# Patient Record
Sex: FEMALE | Race: WHITE | NOT HISPANIC OR LATINO | Employment: OTHER | URBAN - METROPOLITAN AREA
[De-identification: names, ages, dates, MRNs, and addresses within clinical notes are randomized per-mention and may not be internally consistent; named-entity substitution may affect disease eponyms.]

---

## 2021-04-12 ENCOUNTER — HOSPITAL ENCOUNTER (OUTPATIENT)
Dept: LAB | Facility: MEDICAL CENTER | Age: 76
End: 2021-04-12
Attending: ORTHOPAEDIC SURGERY
Payer: MEDICARE

## 2021-04-12 LAB
COVID ORDER STATUS COVID19: NORMAL
SARS-COV-2 RNA RESP QL NAA+PROBE: NOTDETECTED
SPECIMEN SOURCE: NORMAL

## 2021-04-12 PROCEDURE — U0003 INFECTIOUS AGENT DETECTION BY NUCLEIC ACID (DNA OR RNA); SEVERE ACUTE RESPIRATORY SYNDROME CORONAVIRUS 2 (SARS-COV-2) (CORONAVIRUS DISEASE [COVID-19]), AMPLIFIED PROBE TECHNIQUE, MAKING USE OF HIGH THROUGHPUT TECHNOLOGIES AS DESCRIBED BY CMS-2020-01-R: HCPCS

## 2021-04-12 PROCEDURE — U0005 INFEC AGEN DETEC AMPLI PROBE: HCPCS

## 2022-06-22 ENCOUNTER — HOSPITAL ENCOUNTER (OUTPATIENT)
Dept: RADIOLOGY | Facility: MEDICAL CENTER | Age: 77
End: 2022-06-22
Attending: PHYSICAL MEDICINE & REHABILITATION
Payer: MEDICARE

## 2022-06-22 DIAGNOSIS — M54.50 ACUTE BILATERAL LOW BACK PAIN, UNSPECIFIED WHETHER SCIATICA PRESENT: ICD-10-CM

## 2022-06-22 DIAGNOSIS — M54.6 PAIN IN THORACIC SPINE: ICD-10-CM

## 2022-06-22 PROCEDURE — 72100 X-RAY EXAM L-S SPINE 2/3 VWS: CPT

## 2022-06-22 PROCEDURE — 72070 X-RAY EXAM THORAC SPINE 2VWS: CPT

## 2022-10-04 ENCOUNTER — HOSPITAL ENCOUNTER (OUTPATIENT)
Facility: MEDICAL CENTER | Age: 77
End: 2022-10-04
Attending: STUDENT IN AN ORGANIZED HEALTH CARE EDUCATION/TRAINING PROGRAM
Payer: MEDICARE

## 2022-10-04 PROCEDURE — 87086 URINE CULTURE/COLONY COUNT: CPT

## 2022-10-07 LAB
BACTERIA UR CULT: NORMAL
SIGNIFICANT IND 70042: NORMAL
SITE SITE: NORMAL
SOURCE SOURCE: NORMAL

## 2022-11-22 ENCOUNTER — PRE-ADMISSION TESTING (OUTPATIENT)
Dept: ADMISSIONS | Facility: MEDICAL CENTER | Age: 77
End: 2022-11-22
Attending: STUDENT IN AN ORGANIZED HEALTH CARE EDUCATION/TRAINING PROGRAM
Payer: MEDICARE

## 2022-11-22 RX ORDER — ACETAMINOPHEN 500 MG
500-1000 TABLET ORAL EVERY 6 HOURS PRN
COMMUNITY

## 2022-11-22 RX ORDER — DIPHENHYDRAMINE HCL 25 MG
25 CAPSULE ORAL EVERY 6 HOURS PRN
COMMUNITY

## 2022-11-22 RX ORDER — DOCUSATE SODIUM 100 MG/1
1 CAPSULE, LIQUID FILLED ORAL 2 TIMES DAILY PRN
COMMUNITY
Start: 2022-11-03

## 2022-11-22 RX ORDER — BENAZEPRIL HYDROCHLORIDE 10 MG/1
10 TABLET ORAL EVERY MORNING
COMMUNITY
Start: 2022-09-14

## 2022-11-22 RX ORDER — OMEPRAZOLE 20 MG/1
20 CAPSULE, DELAYED RELEASE ORAL DAILY
COMMUNITY
Start: 2022-09-14

## 2022-11-22 RX ORDER — OXYCODONE HYDROCHLORIDE 5 MG/1
5 TABLET ORAL EVERY 8 HOURS PRN
Status: ON HOLD | COMMUNITY
Start: 2022-11-03 | End: 2022-12-08

## 2022-11-22 RX ORDER — OXYBUTYNIN CHLORIDE 10 MG/1
10 TABLET, EXTENDED RELEASE ORAL EVERY MORNING
COMMUNITY

## 2022-11-22 RX ORDER — TAMSULOSIN HYDROCHLORIDE 0.4 MG/1
0.4 CAPSULE ORAL
Status: ON HOLD | COMMUNITY
Start: 2022-11-08 | End: 2022-12-08

## 2022-11-22 RX ORDER — FERROUS SULFATE 325(65) MG
325 TABLET ORAL
COMMUNITY

## 2022-11-22 RX ORDER — HYDROCHLOROTHIAZIDE 12.5 MG/1
12.5 CAPSULE, GELATIN COATED ORAL DAILY
Status: ON HOLD | COMMUNITY
End: 2022-12-08

## 2022-11-22 NOTE — OR NURSING
Patient reports all preop testing including: EKG, blood draw and urine sample completed at [facility] the week of 11/14/2022.  Request sent to obtain results.

## 2022-12-08 ENCOUNTER — ANESTHESIA (OUTPATIENT)
Dept: SURGERY | Facility: MEDICAL CENTER | Age: 77
End: 2022-12-08
Payer: MEDICARE

## 2022-12-08 ENCOUNTER — ANESTHESIA EVENT (OUTPATIENT)
Dept: SURGERY | Facility: MEDICAL CENTER | Age: 77
End: 2022-12-08
Payer: MEDICARE

## 2022-12-08 ENCOUNTER — HOSPITAL ENCOUNTER (OUTPATIENT)
Facility: MEDICAL CENTER | Age: 77
End: 2022-12-08
Attending: STUDENT IN AN ORGANIZED HEALTH CARE EDUCATION/TRAINING PROGRAM | Admitting: STUDENT IN AN ORGANIZED HEALTH CARE EDUCATION/TRAINING PROGRAM
Payer: MEDICARE

## 2022-12-08 ENCOUNTER — APPOINTMENT (OUTPATIENT)
Dept: RADIOLOGY | Facility: MEDICAL CENTER | Age: 77
End: 2022-12-08
Attending: STUDENT IN AN ORGANIZED HEALTH CARE EDUCATION/TRAINING PROGRAM
Payer: MEDICARE

## 2022-12-08 VITALS
DIASTOLIC BLOOD PRESSURE: 68 MMHG | OXYGEN SATURATION: 95 % | HEIGHT: 61 IN | WEIGHT: 206.79 LBS | RESPIRATION RATE: 16 BRPM | BODY MASS INDEX: 39.04 KG/M2 | SYSTOLIC BLOOD PRESSURE: 144 MMHG | HEART RATE: 86 BPM | TEMPERATURE: 98.1 F

## 2022-12-08 DIAGNOSIS — N28.9 RENAL DISORDER: ICD-10-CM

## 2022-12-08 PROBLEM — K21.9 ACID REFLUX: Status: ACTIVE | Noted: 2022-11-22

## 2022-12-08 PROBLEM — I10 HYPERTENSION: Status: ACTIVE | Noted: 2022-11-22

## 2022-12-08 LAB — EKG IMPRESSION: NORMAL

## 2022-12-08 PROCEDURE — 160048 HCHG OR STATISTICAL LEVEL 1-5: Performed by: STUDENT IN AN ORGANIZED HEALTH CARE EDUCATION/TRAINING PROGRAM

## 2022-12-08 PROCEDURE — 700102 HCHG RX REV CODE 250 W/ 637 OVERRIDE(OP): Performed by: ANESTHESIOLOGY

## 2022-12-08 PROCEDURE — 160002 HCHG RECOVERY MINUTES (STAT): Performed by: STUDENT IN AN ORGANIZED HEALTH CARE EDUCATION/TRAINING PROGRAM

## 2022-12-08 PROCEDURE — 160028 HCHG SURGERY MINUTES - 1ST 30 MINS LEVEL 3: Performed by: STUDENT IN AN ORGANIZED HEALTH CARE EDUCATION/TRAINING PROGRAM

## 2022-12-08 PROCEDURE — 00918 ANES TRURL PX URTRL CAL RMVL: CPT | Performed by: ANESTHESIOLOGY

## 2022-12-08 PROCEDURE — 700101 HCHG RX REV CODE 250: Performed by: STUDENT IN AN ORGANIZED HEALTH CARE EDUCATION/TRAINING PROGRAM

## 2022-12-08 PROCEDURE — 700101 HCHG RX REV CODE 250: Performed by: ANESTHESIOLOGY

## 2022-12-08 PROCEDURE — 93010 ELECTROCARDIOGRAM REPORT: CPT | Performed by: INTERNAL MEDICINE

## 2022-12-08 PROCEDURE — 700105 HCHG RX REV CODE 258: Performed by: STUDENT IN AN ORGANIZED HEALTH CARE EDUCATION/TRAINING PROGRAM

## 2022-12-08 PROCEDURE — 93005 ELECTROCARDIOGRAM TRACING: CPT | Performed by: STUDENT IN AN ORGANIZED HEALTH CARE EDUCATION/TRAINING PROGRAM

## 2022-12-08 PROCEDURE — 160035 HCHG PACU - 1ST 60 MINS PHASE I: Performed by: STUDENT IN AN ORGANIZED HEALTH CARE EDUCATION/TRAINING PROGRAM

## 2022-12-08 PROCEDURE — C1769 GUIDE WIRE: HCPCS | Performed by: STUDENT IN AN ORGANIZED HEALTH CARE EDUCATION/TRAINING PROGRAM

## 2022-12-08 PROCEDURE — 160009 HCHG ANES TIME/MIN: Performed by: STUDENT IN AN ORGANIZED HEALTH CARE EDUCATION/TRAINING PROGRAM

## 2022-12-08 PROCEDURE — C2617 STENT, NON-COR, TEM W/O DEL: HCPCS | Performed by: STUDENT IN AN ORGANIZED HEALTH CARE EDUCATION/TRAINING PROGRAM

## 2022-12-08 PROCEDURE — 99100 ANES PT EXTEME AGE<1 YR&>70: CPT | Performed by: ANESTHESIOLOGY

## 2022-12-08 PROCEDURE — 160036 HCHG PACU - EA ADDL 30 MINS PHASE I: Performed by: STUDENT IN AN ORGANIZED HEALTH CARE EDUCATION/TRAINING PROGRAM

## 2022-12-08 PROCEDURE — 700111 HCHG RX REV CODE 636 W/ 250 OVERRIDE (IP): Performed by: ANESTHESIOLOGY

## 2022-12-08 PROCEDURE — A9270 NON-COVERED ITEM OR SERVICE: HCPCS | Performed by: ANESTHESIOLOGY

## 2022-12-08 PROCEDURE — 160039 HCHG SURGERY MINUTES - EA ADDL 1 MIN LEVEL 3: Performed by: STUDENT IN AN ORGANIZED HEALTH CARE EDUCATION/TRAINING PROGRAM

## 2022-12-08 DEVICE — STENT UROLOGICAL POLARIS 6X24  ULTRA: Type: IMPLANTABLE DEVICE | Site: URETER | Status: FUNCTIONAL

## 2022-12-08 RX ORDER — PHENAZOPYRIDINE HYDROCHLORIDE 200 MG/1
200 TABLET, FILM COATED ORAL 3 TIMES DAILY PRN
Qty: 30 TABLET | Refills: 0 | Status: SHIPPED | OUTPATIENT
Start: 2022-12-08 | End: 2022-12-22

## 2022-12-08 RX ORDER — KETOROLAC TROMETHAMINE 10 MG/1
10 TABLET, FILM COATED ORAL EVERY 6 HOURS PRN
Qty: 20 TABLET | Refills: 0 | Status: SHIPPED | OUTPATIENT
Start: 2022-12-08 | End: 2022-12-13

## 2022-12-08 RX ORDER — MAGNESIUM HYDROXIDE 1200 MG/15ML
LIQUID ORAL
Status: COMPLETED | OUTPATIENT
Start: 2022-12-08 | End: 2022-12-08

## 2022-12-08 RX ORDER — HALOPERIDOL 5 MG/ML
1 INJECTION INTRAMUSCULAR
Status: DISCONTINUED | OUTPATIENT
Start: 2022-12-08 | End: 2022-12-08 | Stop reason: HOSPADM

## 2022-12-08 RX ORDER — DIPHENHYDRAMINE HYDROCHLORIDE 50 MG/ML
12.5 INJECTION INTRAMUSCULAR; INTRAVENOUS
Status: DISCONTINUED | OUTPATIENT
Start: 2022-12-08 | End: 2022-12-08 | Stop reason: HOSPADM

## 2022-12-08 RX ORDER — TAMSULOSIN HYDROCHLORIDE 0.4 MG/1
0.4 CAPSULE ORAL DAILY
Qty: 14 CAPSULE | Refills: 0 | Status: SHIPPED | OUTPATIENT
Start: 2022-12-08 | End: 2022-12-22

## 2022-12-08 RX ORDER — HYDROMORPHONE HYDROCHLORIDE 1 MG/ML
0.2 INJECTION, SOLUTION INTRAMUSCULAR; INTRAVENOUS; SUBCUTANEOUS
Status: DISCONTINUED | OUTPATIENT
Start: 2022-12-08 | End: 2022-12-08 | Stop reason: HOSPADM

## 2022-12-08 RX ORDER — HYDRALAZINE HYDROCHLORIDE 20 MG/ML
5 INJECTION INTRAMUSCULAR; INTRAVENOUS
Status: DISCONTINUED | OUTPATIENT
Start: 2022-12-08 | End: 2022-12-08 | Stop reason: HOSPADM

## 2022-12-08 RX ORDER — LIDOCAINE HYDROCHLORIDE 20 MG/ML
INJECTION, SOLUTION EPIDURAL; INFILTRATION; INTRACAUDAL; PERINEURAL PRN
Status: DISCONTINUED | OUTPATIENT
Start: 2022-12-08 | End: 2022-12-08 | Stop reason: SURG

## 2022-12-08 RX ORDER — CEPHALEXIN 500 MG/1
500 CAPSULE ORAL 2 TIMES DAILY
Qty: 10 CAPSULE | Refills: 0 | Status: SHIPPED | OUTPATIENT
Start: 2022-12-08 | End: 2022-12-13

## 2022-12-08 RX ORDER — ONDANSETRON 2 MG/ML
4 INJECTION INTRAMUSCULAR; INTRAVENOUS
Status: DISCONTINUED | OUTPATIENT
Start: 2022-12-08 | End: 2022-12-08 | Stop reason: HOSPADM

## 2022-12-08 RX ORDER — KETOROLAC TROMETHAMINE 30 MG/ML
15 INJECTION, SOLUTION INTRAMUSCULAR; INTRAVENOUS ONCE
Status: COMPLETED | OUTPATIENT
Start: 2022-12-08 | End: 2022-12-08

## 2022-12-08 RX ORDER — DEXAMETHASONE SODIUM PHOSPHATE 4 MG/ML
INJECTION, SOLUTION INTRA-ARTICULAR; INTRALESIONAL; INTRAMUSCULAR; INTRAVENOUS; SOFT TISSUE PRN
Status: DISCONTINUED | OUTPATIENT
Start: 2022-12-08 | End: 2022-12-08 | Stop reason: SURG

## 2022-12-08 RX ORDER — CEFAZOLIN SODIUM 1 G/3ML
INJECTION, POWDER, FOR SOLUTION INTRAMUSCULAR; INTRAVENOUS PRN
Status: DISCONTINUED | OUTPATIENT
Start: 2022-12-08 | End: 2022-12-08 | Stop reason: SURG

## 2022-12-08 RX ORDER — SODIUM CHLORIDE, SODIUM LACTATE, POTASSIUM CHLORIDE, CALCIUM CHLORIDE 600; 310; 30; 20 MG/100ML; MG/100ML; MG/100ML; MG/100ML
1000 INJECTION, SOLUTION INTRAVENOUS CONTINUOUS
Status: DISCONTINUED | OUTPATIENT
Start: 2022-12-08 | End: 2022-12-08 | Stop reason: HOSPADM

## 2022-12-08 RX ORDER — ONDANSETRON 2 MG/ML
INJECTION INTRAMUSCULAR; INTRAVENOUS PRN
Status: DISCONTINUED | OUTPATIENT
Start: 2022-12-08 | End: 2022-12-08 | Stop reason: SURG

## 2022-12-08 RX ORDER — HYDROMORPHONE HYDROCHLORIDE 1 MG/ML
0.1 INJECTION, SOLUTION INTRAMUSCULAR; INTRAVENOUS; SUBCUTANEOUS
Status: DISCONTINUED | OUTPATIENT
Start: 2022-12-08 | End: 2022-12-08 | Stop reason: HOSPADM

## 2022-12-08 RX ORDER — HYDROMORPHONE HYDROCHLORIDE 1 MG/ML
0.4 INJECTION, SOLUTION INTRAMUSCULAR; INTRAVENOUS; SUBCUTANEOUS
Status: DISCONTINUED | OUTPATIENT
Start: 2022-12-08 | End: 2022-12-08 | Stop reason: HOSPADM

## 2022-12-08 RX ADMIN — PROPOFOL 150 MG: 10 INJECTION, EMULSION INTRAVENOUS at 13:51

## 2022-12-08 RX ADMIN — SODIUM CHLORIDE, POTASSIUM CHLORIDE, SODIUM LACTATE AND CALCIUM CHLORIDE 1000 ML: 600; 310; 30; 20 INJECTION, SOLUTION INTRAVENOUS at 12:52

## 2022-12-08 RX ADMIN — LIDOCAINE HYDROCHLORIDE 80 MG: 20 INJECTION, SOLUTION EPIDURAL; INFILTRATION; INTRACAUDAL at 13:51

## 2022-12-08 RX ADMIN — FENTANYL CITRATE 25 MCG: 50 INJECTION, SOLUTION INTRAMUSCULAR; INTRAVENOUS at 14:49

## 2022-12-08 RX ADMIN — CEFAZOLIN 2 G: 330 INJECTION, POWDER, FOR SOLUTION INTRAMUSCULAR; INTRAVENOUS at 13:51

## 2022-12-08 RX ADMIN — DEXAMETHASONE SODIUM PHOSPHATE 6 MG: 4 INJECTION, SOLUTION INTRA-ARTICULAR; INTRALESIONAL; INTRAMUSCULAR; INTRAVENOUS; SOFT TISSUE at 14:03

## 2022-12-08 RX ADMIN — HYDROCODONE BITARTRATE AND ACETAMINOPHEN 7.5 MG: 7.5; 325 SOLUTION ORAL at 14:45

## 2022-12-08 RX ADMIN — ONDANSETRON 4 MG: 2 INJECTION INTRAMUSCULAR; INTRAVENOUS at 14:03

## 2022-12-08 RX ADMIN — FENTANYL CITRATE 50 MCG: 50 INJECTION, SOLUTION INTRAMUSCULAR; INTRAVENOUS at 13:51

## 2022-12-08 RX ADMIN — KETOROLAC TROMETHAMINE 15 MG: 30 INJECTION, SOLUTION INTRAMUSCULAR at 16:51

## 2022-12-08 RX ADMIN — DIPHENHYDRAMINE HYDROCHLORIDE 12.5 MG: 50 INJECTION INTRAMUSCULAR; INTRAVENOUS at 14:56

## 2022-12-08 RX ADMIN — FENTANYL CITRATE 25 MCG: 50 INJECTION, SOLUTION INTRAMUSCULAR; INTRAVENOUS at 15:13

## 2022-12-08 ASSESSMENT — PAIN DESCRIPTION - PAIN TYPE
TYPE: SURGICAL PAIN

## 2022-12-08 NOTE — ANESTHESIA PROCEDURE NOTES
Airway    Date/Time: 12/8/2022 1:51 PM  Performed by: Akiko Mcmillan M.D.  Authorized by: Akiko Mcmillan M.D.     Location:  OR  Urgency:  Elective  Difficult Airway: No    Indications for Airway Management:  Anesthesia      Spontaneous Ventilation: absent    Sedation Level:  Deep  Preoxygenated: Yes    Patient Position:  Sniffing  MILS Maintained Throughout: No    Mask Difficulty Assessment:  0 - not attempted  Final Airway Type:  Supraglottic airway  Final Supraglottic Airway:  Standard LMA    SGA Size:  4  Number of Attempts at Approach:  1  Number of Other Approaches Attempted:  0

## 2022-12-08 NOTE — DISCHARGE INSTRUCTIONS
"DR. MARIANO'S DISCHARGE INSTRUCTIONS FOLLOWING   URETEROSCOPY AND LASER LITHOTRIPSY      DIET:  You can resume your regular diet. We encourage you to eat well-balanced and nutritious meals.      ACTIVITY:  Please restrain from strenuous activity or heavy lifting (more than 20 pounds) for the next week.  Please walk daily as much as tolerated, making exercise a part of your daily life. Do not drive while using narcotics for pain control. You may resumes showering and bathing without any restrictions.     WOUND CARE:  1. You have no dressing or open wounds to manage.   2. You have an internal ureteral stent OFF strings. We will need to leave this in place until your follow up procedure (2nd look ureteroscopy) in 2-4 weeks.  The stent will likely be removed at that visit, but possible replaced for short period    MEDICATIONS:  - Please use an over the counter antiinflammatory (ie Ibuprofen, naproxen, Ketoralac) and/or Tylenol for pain control as needed.  - You may take 3 days of pyridium as prescribed for numbing the bladder and burning with urination.  - You have been prescribed oxybutynin prn to help with bladder spasms or burning with urination. Please hold this if having difficulty urinating however.   - If you have severe pain refractory to Ibuprofen you may use Oxycodone for pain control as well as prescribed. If taking a narcotic, please use a stool softener like miralax or colace to prevent constipation.  - If you are using aspirin, Plavix, or coumadin, please don't restart these medications until 1 day after your discharge if you are not having large amounts of blood in the urine.  - Please complete a course of Keflex (An antibiotic) as prescribed to treat or prevent infection.    FOLLOW-UP:  We will call you to schedule your follow up \"2nd look ureteroscopy\" to finish treating the rest of stone burden in 2-4 weeks with Dr. Mariano. If you have not heard from us in 3-4 business days, please call [phone] to " schedule your follow-up appointment. You may also contact this number if you have questions or concerns that can be answered by Dr. Mariano's staff.      WARNING SIGNS:  Fever greater than 101 degrees Fahrenheit, chills, nausea or vomiting, Large amount of clots in urine that make it difficult to urinate or for urine to drain from holder, increasing pain, or abdominal swelling. If you are experiencing these symptoms, call the Urology Clinic or go to your local PCP or emergency room.    It is normal to see blood in your urine for up to 2 weeks even from surgery. The urine may clear up entirely, and then turn bloody again a few days later depending on your activity level; do not be alarmed. However, if you experience severe pain or tenderness, have a lot of increased bleeding, or find that you are unable to urinate because of large clots, please notify your doctor immediately           Jim Mariano M.D.   [address]  [place] [address]   [phone]

## 2022-12-08 NOTE — OR NURSING
Assume care for patient in pre-op. NPO status and allergies verified. Surgical procedure verified by patient. PIV inserted. All questions answered. Belongings locked-up. Call light within reach.    ECG done  Mobilizes independently with cane  Has some scratches because her skin is quite dry  Also noted old bruises, she said she easily bruises.

## 2022-12-08 NOTE — ANESTHESIA TIME REPORT
Anesthesia Start and Stop Event Times     Date Time Event    12/8/2022 01:34 PM Ready for Procedure    12/8/2022 01:47 PM Anesthesia Start    12/8/2022 02:32 PM Anesthesia Stop        Responsible Staff  12/08/22    Name Role Begin End    Akiko Mcmillan M.D. Anesthesiologist 12/08/22 01:47 PM 12/08/22 02:32 PM        Overtime Reason:  no overtime (within assigned shift)    Comments:

## 2022-12-08 NOTE — ANESTHESIA PREPROCEDURE EVALUATION
Case: [id number] Date/Time: 12/08/22 1315    Procedures:       CYSTOSCOPY, WITH LEFT URETEROSCOPY, WITH LITHOTRIPSY, WITH INSERTION OF LEFT URETERAL STENT      URETEROSCOPY      LITHOTRIPSY, USING LASER    Pre-op diagnosis: KIDNEY STONE    Location: [facility] OR 18 / SURGERY [facility]    Surgeons: Jim Mariano M.D.          Relevant Problems   CARDIAC   (positive) Hypertension      GI   (positive) Acid reflux         (positive) Renal disorder       Physical Exam    Airway   Mallampati: I  TM distance: >3 FB  Neck ROM: full       Cardiovascular - normal exam  Rhythm: regular  Rate: normal  (-) murmur     Dental - normal exam  (+) upper dentures, lower dentures           Pulmonary - normal exam  Breath sounds clear to auscultation     Abdominal    Neurological - normal exam               Anesthesia Plan    ASA 2       Plan - general       Airway plan will be LMA        Plan Factors:   Patient was not previously instructed to abstain from smoking on day of procedure.  Patient did not smoke on day of procedure.      Induction: intravenous    Postoperative Plan: Postoperative administration of opioids is intended.    Pertinent diagnostic labs and testing reviewed    Informed Consent:    Anesthetic plan and risks discussed with patient.    Use of blood products discussed with: patient whom consented to blood products.

## 2022-12-08 NOTE — OP REPORT
Urology Operative Report    Date: 12/8/2022    Pre-operative diagnosis: Left renal stone    Post-operative diagnosis: Left renal stone    Procedures:  Cystoscopy, left ureteroscopy with laser lithotripsy, left ureteral stent exchange    Surgeon: Surgeon(s) and Role:     * Jim Mariano M.D. - Primary    Anesthesia: General    EBL: Minimal    IV fluids: Per anesthesia.    Specimens: None    Complications: None    Drains: 6 Portuguese by 24 cm stent on the left side    Disposition:  PACU, discharge after voiding  Follow up 4 weeks for second look ureteroscopy    Findings:  Large amount of residual left renal stone burden.  Stone noted to be extremely hard and difficult to break up with the laser.    Indications for procedure:    Rachael Stone is a 77 y.o female who presented previously with a large left renal stone and underwent left PCNL.  Stone was noted to be extremely hard, therefore clearance was incomplete after the procedure.  She presents for a second look procedure.  Risks and benefits were discussed in detail and she agreed to proceed.    Details of procedure:    Patient was seen in the preoperative area and informed consent was obtained.  They were transported to the operating room and underwent general anesthesia without complication.  Ancef 2 g was administered for antibiotic prophylaxis.  Timeout was completed.  Patient was positioned in dorsal lithotomy and then prepped and draped.    22 Portuguese cystoscope was inserted into the bladder per urethra. Cystoscopy was unremarkable.  Existing left stent was noted to be in good position.  Sensor wire was advanced into the left kidney under fluoroscopic guidance.  Stent was grasped and removed intact.  We then threaded the digital flexible ureteroscope over the wire into the left kidney.  Pyeloscopy was noted for a significant amount of residual stone burden.  200 µm holmium laser fiber was inserted and the stone was systematically fragmented.  Stone was  noted to be extremely hard and difficult to fragment.  After approximately 20 minutes, we elected to stop and plan for a second look procedure in order in order to reduce the risk of infection.  Approximately 1/3-1/2 of the total stone burden was treated.  Sensor wire was replaced and the scope was slowly withdrawn.  Ureter was noted to be patent and stone free.  Under fluoroscopic guidance we placed a 6 Croatian by 24 cm stent into the left kidney.  Good curls were noted on fluoroscopy.  Bladder was emptied with the scope.  This concluded the procedure.  Patient was awoken from anesthesia and transferred to the PACU for recovery.

## 2022-12-12 ASSESSMENT — PAIN SCALES - GENERAL: PAIN_LEVEL: 2

## 2022-12-12 NOTE — ANESTHESIA POSTPROCEDURE EVALUATION
Patient: Rachael Stone    Procedure Summary     Date: 12/08/22 Room / Location: [place] [address] / SURGERY [facility]    Anesthesia Start: 1347 Anesthesia Stop: 1432    Procedures:       CYSTOSCOPY, WITH LEFT URETEROSCOPY, WITH LITHOTRIPSY, WITH INSERTION OF LEFT URETERAL STENT (Left: Ureter)      URETEROSCOPY (Left: Ureter)      LITHOTRIPSY, USING LASER (Left: Ureter) Diagnosis: (KIDNEY STONE)    Surgeons: Jim Mariano M.D. Responsible Provider: Akiko Mcmillan M.D.    Anesthesia Type: general ASA Status: 2          Final Anesthesia Type: general  Last vitals  BP    see RN charting on VS's   Temp        Pulse       Resp        SpO2          Anesthesia Post Evaluation    Patient location during evaluation: PACU  Patient participation: complete - patient participated  Level of consciousness: awake and alert  Pain score: 2    Airway patency: patent  Anesthetic complications: no  Cardiovascular status: hemodynamically stable  Respiratory status: acceptable  Hydration status: euvolemic    PONV: none          No notable events documented.     Nurse Pain Score: 2 (NPRS)

## 2022-12-27 ENCOUNTER — PRE-ADMISSION TESTING (OUTPATIENT)
Dept: ADMISSIONS | Facility: MEDICAL CENTER | Age: 77
End: 2022-12-27
Attending: STUDENT IN AN ORGANIZED HEALTH CARE EDUCATION/TRAINING PROGRAM
Payer: MEDICARE

## 2022-12-29 ENCOUNTER — PRE-ADMISSION TESTING (OUTPATIENT)
Dept: ADMISSIONS | Facility: MEDICAL CENTER | Age: 77
End: 2022-12-29
Attending: STUDENT IN AN ORGANIZED HEALTH CARE EDUCATION/TRAINING PROGRAM
Payer: MEDICARE

## 2023-01-10 NOTE — OR NURSING
Result of urinalysis and EKG faxed to  for MD review. Urine culture is pending. Fax sent to [facility] for result. Confirmation report of fax received.

## 2023-01-13 ENCOUNTER — ANESTHESIA (OUTPATIENT)
Dept: SURGERY | Facility: MEDICAL CENTER | Age: 78
End: 2023-01-13
Payer: MEDICARE

## 2023-01-13 ENCOUNTER — ANESTHESIA EVENT (OUTPATIENT)
Dept: SURGERY | Facility: MEDICAL CENTER | Age: 78
End: 2023-01-13
Payer: MEDICARE

## 2023-01-13 ENCOUNTER — HOSPITAL ENCOUNTER (OUTPATIENT)
Facility: MEDICAL CENTER | Age: 78
End: 2023-01-13
Attending: STUDENT IN AN ORGANIZED HEALTH CARE EDUCATION/TRAINING PROGRAM | Admitting: STUDENT IN AN ORGANIZED HEALTH CARE EDUCATION/TRAINING PROGRAM
Payer: MEDICARE

## 2023-01-13 ENCOUNTER — APPOINTMENT (OUTPATIENT)
Dept: RADIOLOGY | Facility: MEDICAL CENTER | Age: 78
End: 2023-01-13
Attending: STUDENT IN AN ORGANIZED HEALTH CARE EDUCATION/TRAINING PROGRAM
Payer: MEDICARE

## 2023-01-13 VITALS
HEIGHT: 61 IN | OXYGEN SATURATION: 94 % | SYSTOLIC BLOOD PRESSURE: 158 MMHG | WEIGHT: 206.57 LBS | HEART RATE: 86 BPM | DIASTOLIC BLOOD PRESSURE: 74 MMHG | BODY MASS INDEX: 39 KG/M2 | TEMPERATURE: 98.5 F | RESPIRATION RATE: 16 BRPM

## 2023-01-13 DIAGNOSIS — N20.0 NEPHROLITHIASIS: ICD-10-CM

## 2023-01-13 PROCEDURE — 160048 HCHG OR STATISTICAL LEVEL 1-5: Performed by: STUDENT IN AN ORGANIZED HEALTH CARE EDUCATION/TRAINING PROGRAM

## 2023-01-13 PROCEDURE — 160025 RECOVERY II MINUTES (STATS): Performed by: STUDENT IN AN ORGANIZED HEALTH CARE EDUCATION/TRAINING PROGRAM

## 2023-01-13 PROCEDURE — 160046 HCHG PACU - 1ST 60 MINS PHASE II: Performed by: STUDENT IN AN ORGANIZED HEALTH CARE EDUCATION/TRAINING PROGRAM

## 2023-01-13 PROCEDURE — 700101 HCHG RX REV CODE 250: Performed by: STUDENT IN AN ORGANIZED HEALTH CARE EDUCATION/TRAINING PROGRAM

## 2023-01-13 PROCEDURE — 700111 HCHG RX REV CODE 636 W/ 250 OVERRIDE (IP): Performed by: ANESTHESIOLOGY

## 2023-01-13 PROCEDURE — 160002 HCHG RECOVERY MINUTES (STAT): Performed by: STUDENT IN AN ORGANIZED HEALTH CARE EDUCATION/TRAINING PROGRAM

## 2023-01-13 PROCEDURE — C1747 HCHG SHELL REV 278 C1747: HCPCS | Performed by: STUDENT IN AN ORGANIZED HEALTH CARE EDUCATION/TRAINING PROGRAM

## 2023-01-13 PROCEDURE — 700102 HCHG RX REV CODE 250 W/ 637 OVERRIDE(OP): Performed by: ANESTHESIOLOGY

## 2023-01-13 PROCEDURE — 00918 ANES TRURL PX URTRL CAL RMVL: CPT | Performed by: ANESTHESIOLOGY

## 2023-01-13 PROCEDURE — 700101 HCHG RX REV CODE 250: Performed by: ANESTHESIOLOGY

## 2023-01-13 PROCEDURE — 160035 HCHG PACU - 1ST 60 MINS PHASE I: Performed by: STUDENT IN AN ORGANIZED HEALTH CARE EDUCATION/TRAINING PROGRAM

## 2023-01-13 PROCEDURE — 99100 ANES PT EXTEME AGE<1 YR&>70: CPT | Performed by: ANESTHESIOLOGY

## 2023-01-13 PROCEDURE — 160009 HCHG ANES TIME/MIN: Performed by: STUDENT IN AN ORGANIZED HEALTH CARE EDUCATION/TRAINING PROGRAM

## 2023-01-13 PROCEDURE — C1769 GUIDE WIRE: HCPCS | Performed by: STUDENT IN AN ORGANIZED HEALTH CARE EDUCATION/TRAINING PROGRAM

## 2023-01-13 PROCEDURE — A9270 NON-COVERED ITEM OR SERVICE: HCPCS | Performed by: ANESTHESIOLOGY

## 2023-01-13 PROCEDURE — C2617 STENT, NON-COR, TEM W/O DEL: HCPCS | Performed by: STUDENT IN AN ORGANIZED HEALTH CARE EDUCATION/TRAINING PROGRAM

## 2023-01-13 PROCEDURE — 160028 HCHG SURGERY MINUTES - 1ST 30 MINS LEVEL 3: Performed by: STUDENT IN AN ORGANIZED HEALTH CARE EDUCATION/TRAINING PROGRAM

## 2023-01-13 PROCEDURE — 700105 HCHG RX REV CODE 258: Performed by: STUDENT IN AN ORGANIZED HEALTH CARE EDUCATION/TRAINING PROGRAM

## 2023-01-13 PROCEDURE — 160039 HCHG SURGERY MINUTES - EA ADDL 1 MIN LEVEL 3: Performed by: STUDENT IN AN ORGANIZED HEALTH CARE EDUCATION/TRAINING PROGRAM

## 2023-01-13 DEVICE — STENT UROLOGICAL POLARIS 6X24  ULTRA: Type: IMPLANTABLE DEVICE | Site: URETER | Status: FUNCTIONAL

## 2023-01-13 RX ORDER — IPRATROPIUM BROMIDE AND ALBUTEROL SULFATE 2.5; .5 MG/3ML; MG/3ML
3 SOLUTION RESPIRATORY (INHALATION)
Status: DISCONTINUED | OUTPATIENT
Start: 2023-01-13 | End: 2023-01-13 | Stop reason: HOSPADM

## 2023-01-13 RX ORDER — METOPROLOL TARTRATE 1 MG/ML
1 INJECTION, SOLUTION INTRAVENOUS
Status: DISCONTINUED | OUTPATIENT
Start: 2023-01-13 | End: 2023-01-13 | Stop reason: HOSPADM

## 2023-01-13 RX ORDER — HYDRALAZINE HYDROCHLORIDE 20 MG/ML
5 INJECTION INTRAMUSCULAR; INTRAVENOUS
Status: DISCONTINUED | OUTPATIENT
Start: 2023-01-13 | End: 2023-01-13 | Stop reason: HOSPADM

## 2023-01-13 RX ORDER — ONDANSETRON 2 MG/ML
4 INJECTION INTRAMUSCULAR; INTRAVENOUS
Status: DISCONTINUED | OUTPATIENT
Start: 2023-01-13 | End: 2023-01-13 | Stop reason: HOSPADM

## 2023-01-13 RX ORDER — DIPHENHYDRAMINE HYDROCHLORIDE 50 MG/ML
12.5 INJECTION INTRAMUSCULAR; INTRAVENOUS
Status: DISCONTINUED | OUTPATIENT
Start: 2023-01-13 | End: 2023-01-13 | Stop reason: HOSPADM

## 2023-01-13 RX ORDER — SODIUM CHLORIDE, SODIUM LACTATE, POTASSIUM CHLORIDE, CALCIUM CHLORIDE 600; 310; 30; 20 MG/100ML; MG/100ML; MG/100ML; MG/100ML
INJECTION, SOLUTION INTRAVENOUS CONTINUOUS
Status: ACTIVE | OUTPATIENT
Start: 2023-01-13 | End: 2023-01-13

## 2023-01-13 RX ORDER — HYDROMORPHONE HYDROCHLORIDE 1 MG/ML
0.2 INJECTION, SOLUTION INTRAMUSCULAR; INTRAVENOUS; SUBCUTANEOUS
Status: DISCONTINUED | OUTPATIENT
Start: 2023-01-13 | End: 2023-01-13 | Stop reason: HOSPADM

## 2023-01-13 RX ORDER — OXYCODONE HCL 5 MG/5 ML
5 SOLUTION, ORAL ORAL
Status: DISCONTINUED | OUTPATIENT
Start: 2023-01-13 | End: 2023-01-13 | Stop reason: HOSPADM

## 2023-01-13 RX ORDER — OXYCODONE HCL 5 MG/5 ML
10 SOLUTION, ORAL ORAL
Status: DISCONTINUED | OUTPATIENT
Start: 2023-01-13 | End: 2023-01-13 | Stop reason: HOSPADM

## 2023-01-13 RX ORDER — OMEPRAZOLE 20 MG/1
20 CAPSULE, DELAYED RELEASE ORAL ONCE
Status: COMPLETED | OUTPATIENT
Start: 2023-01-13 | End: 2023-01-13

## 2023-01-13 RX ORDER — HALOPERIDOL 5 MG/ML
1 INJECTION INTRAMUSCULAR
Status: DISCONTINUED | OUTPATIENT
Start: 2023-01-13 | End: 2023-01-13 | Stop reason: HOSPADM

## 2023-01-13 RX ORDER — CEFAZOLIN SODIUM 1 G/3ML
INJECTION, POWDER, FOR SOLUTION INTRAMUSCULAR; INTRAVENOUS PRN
Status: DISCONTINUED | OUTPATIENT
Start: 2023-01-13 | End: 2023-01-13 | Stop reason: SURG

## 2023-01-13 RX ORDER — LIDOCAINE HYDROCHLORIDE 20 MG/ML
INJECTION, SOLUTION EPIDURAL; INFILTRATION; INTRACAUDAL; PERINEURAL PRN
Status: DISCONTINUED | OUTPATIENT
Start: 2023-01-13 | End: 2023-01-13 | Stop reason: SURG

## 2023-01-13 RX ORDER — CEPHALEXIN 500 MG/1
500 CAPSULE ORAL 2 TIMES DAILY
Qty: 6 CAPSULE | Refills: 0 | Status: SHIPPED | OUTPATIENT
Start: 2023-01-13 | End: 2023-01-16

## 2023-01-13 RX ORDER — LIDOCAINE HYDROCHLORIDE 20 MG/ML
JELLY TOPICAL
Status: DISCONTINUED | OUTPATIENT
Start: 2023-01-13 | End: 2023-01-13 | Stop reason: HOSPADM

## 2023-01-13 RX ORDER — SODIUM CHLORIDE, SODIUM LACTATE, POTASSIUM CHLORIDE, CALCIUM CHLORIDE 600; 310; 30; 20 MG/100ML; MG/100ML; MG/100ML; MG/100ML
INJECTION, SOLUTION INTRAVENOUS CONTINUOUS
Status: DISCONTINUED | OUTPATIENT
Start: 2023-01-13 | End: 2023-01-13

## 2023-01-13 RX ORDER — HYDROMORPHONE HYDROCHLORIDE 1 MG/ML
0.4 INJECTION, SOLUTION INTRAMUSCULAR; INTRAVENOUS; SUBCUTANEOUS
Status: DISCONTINUED | OUTPATIENT
Start: 2023-01-13 | End: 2023-01-13 | Stop reason: HOSPADM

## 2023-01-13 RX ORDER — HYDROMORPHONE HYDROCHLORIDE 1 MG/ML
0.1 INJECTION, SOLUTION INTRAMUSCULAR; INTRAVENOUS; SUBCUTANEOUS
Status: DISCONTINUED | OUTPATIENT
Start: 2023-01-13 | End: 2023-01-13 | Stop reason: HOSPADM

## 2023-01-13 RX ORDER — DEXAMETHASONE SODIUM PHOSPHATE 4 MG/ML
INJECTION, SOLUTION INTRA-ARTICULAR; INTRALESIONAL; INTRAMUSCULAR; INTRAVENOUS; SOFT TISSUE PRN
Status: DISCONTINUED | OUTPATIENT
Start: 2023-01-13 | End: 2023-01-13 | Stop reason: SURG

## 2023-01-13 RX ADMIN — SODIUM CHLORIDE, POTASSIUM CHLORIDE, SODIUM LACTATE AND CALCIUM CHLORIDE: 600; 310; 30; 20 INJECTION, SOLUTION INTRAVENOUS at 07:52

## 2023-01-13 RX ADMIN — CEFAZOLIN 2 G: 330 INJECTION, POWDER, FOR SOLUTION INTRAMUSCULAR; INTRAVENOUS at 09:36

## 2023-01-13 RX ADMIN — FENTANYL CITRATE 100 MCG: 50 INJECTION, SOLUTION INTRAMUSCULAR; INTRAVENOUS at 09:29

## 2023-01-13 RX ADMIN — DEXAMETHASONE SODIUM PHOSPHATE 8 MG: 4 INJECTION, SOLUTION INTRA-ARTICULAR; INTRALESIONAL; INTRAMUSCULAR; INTRAVENOUS; SOFT TISSUE at 09:40

## 2023-01-13 RX ADMIN — OMEPRAZOLE 20 MG: 20 CAPSULE, DELAYED RELEASE ORAL at 10:45

## 2023-01-13 RX ADMIN — PROPOFOL 150 MG: 10 INJECTION, EMULSION INTRAVENOUS at 09:31

## 2023-01-13 RX ADMIN — LIDOCAINE HYDROCHLORIDE 100 MG: 20 INJECTION, SOLUTION EPIDURAL; INFILTRATION; INTRACAUDAL at 09:31

## 2023-01-13 RX ADMIN — LIDOCAINE HYDROCHLORIDE 0.5 ML: 10 INJECTION, SOLUTION EPIDURAL; INFILTRATION; INTRACAUDAL at 07:52

## 2023-01-13 ASSESSMENT — PAIN DESCRIPTION - PAIN TYPE
TYPE: SURGICAL PAIN

## 2023-01-13 ASSESSMENT — PAIN SCALES - GENERAL: PAIN_LEVEL: 2

## 2023-01-13 NOTE — ANESTHESIA PROCEDURE NOTES
Airway    Date/Time: 1/13/2023 9:33 AM  Performed by: Amadou Steward M.D.  Authorized by: Amadou Steward M.D.     Location:  OR  Urgency:  Elective  Difficult Airway: No    Indications for Airway Management:  Anesthesia      Spontaneous Ventilation: absent    Sedation Level:  Deep  Preoxygenated: Yes    Mask Difficulty Assessment:  1 - vent by mask  Final Airway Type:  Supraglottic airway  Final Supraglottic Airway:  Standard LMA    SGA Size:  4  Number of Attempts at Approach:  1

## 2023-01-13 NOTE — OP REPORT
Urology Operative Report    Date: 1/13/2023    Pre-operative diagnosis: Left renal stones    Post-operative diagnosis: Left renal stones    Procedures:  Cystoscopy, left ureteroscopy with laser lithotripsy, left ureteral stent placement    Please code with a 22 modifier as the patient's stone size and hardness significantly increased the amount of time required to treat the stone.    Surgeon: Surgeon(s) and Role:     * Jim Mariano M.D. - Primary    Anesthesia: General    EBL: Minimal    IV fluids: Per anesthesia.    Specimens: None    Complications: None    Drains: 6 Romanian by 24 cm stent on left side    Disposition:  PACU, discharge after voiding  Follow up 2 to 4 weeks for second look ureteroscopy    Findings:  Large residual stone burden lasered for approximately 25 minutes.  Estimate 60 to 70% treated at this point.    Indications for procedure:    Rachael Stone is a 77 y.o.female who presented with a large left renal stone she previously underwent unsuccessful PCNL.  We then discussed staged ureteroscopy to treat the stone and she was agreeable to proceeding..  After lengthy discussion of risks and benefits, patient agreed to proceed with cystoscopy, left ureteroscopy with laser lithotripsy, left ureteral stent placement.    Details of procedure:    Patient was seen in the preoperative area and informed consent was obtained.  They were transported to the operating room and underwent general anesthesia without complication.  Ancef 2 g was administered for antibiotic prophylaxis.  Timeout was completed.  Patient was positioned in dorsal lithotomy and then prepped and draped.    22 Romanian cystoscope was inserted into the bladder per urethra. Cystoscopy was unremarkable.  Existing stent was noted to be in good position.  Sensor wire was advanced alongside the stent into the left kidney.  Stent was grasped and removed.  Fluoroscopy was notable for a large amount of residual radiopaque stone.  An 11-13  sheath was advanced into the proximal left ureter without resistance.  We then threaded the digital flexible ureteroscope into the kidney and encountered a large amount of hard stone.  200 µm holmium laser fiber was inserted and the stone was systematically fragmented into smaller pieces.  After about 25 minutes of lasing, there was quite a bit of debris and it was difficult to visualize.  We opted to end the procedure at this point and plan for another staged procedure.  Sensor wire was replaced through the scope and the sheath and scope were withdrawn atraumatically.  6 English by 24 cm stent was started into the left kidney under fluoroscopic guidance.  Good curls were noted on fluoroscopy.  Scope was inserted and bladder was emptied.  Urojet was administered.  This concluded the procedure.  Patient was woke from anesthesia and transported the PACU for recovery.

## 2023-01-13 NOTE — ANESTHESIA PREPROCEDURE EVALUATION
Case: [id number] Date/Time: 01/13/23 0845    Procedures:       CYSTOSCOPY, WITH LEFT URETEROSCOPY, WITH LITHOTRIPSY, WITH INSERTION OF LEFT URETERAL STENT (Left: Ureter)      URETEROSCOPY (Left: Ureter)      LITHOTRIPSY, USING LASER (Left: Ureter)    Anesthesia type: General    Pre-op diagnosis: KIDNEY STONE    Location: [facility] OR 12 / SURGERY [facility]    Surgeons: Jim Mariano M.D.          Relevant Problems   CARDIAC   (positive) Hypertension      GI   (positive) Acid reflux         (positive) Renal disorder       Physical Exam    Airway   Mallampati: III  TM distance: >3 FB  Neck ROM: full       Cardiovascular - normal exam  Rhythm: regular  Rate: normal  (-) murmur     Dental - normal exam           Pulmonary - normal exam  Breath sounds clear to auscultation     Abdominal    Neurological - normal exam                 Anesthesia Plan    ASA 2       Plan - general       Airway plan will be LMA          Induction: intravenous    Postoperative Plan: Postoperative administration of opioids is intended.    Pertinent diagnostic labs and testing reviewed    Informed Consent:    Anesthetic plan and risks discussed with patient.    Use of blood products discussed with: patient whom consented to blood products.

## 2023-01-13 NOTE — DISCHARGE INSTRUCTIONS
"DR. MARIANO'S DISCHARGE INSTRUCTIONS FOLLOWING   URETEROSCOPY AND LASER LITHOTRIPSY      DIET:  You can resume your regular diet. We encourage you to eat well-balanced and nutritious meals.      ACTIVITY:  Please restrain from strenuous activity or heavy lifting (more than 20 pounds) for the next week.  Please walk daily as much as tolerated, making exercise a part of your daily life. Do not drive while using narcotics for pain control. You may resumes showering and bathing without any restrictions.     WOUND CARE:  1. You have no dressing or open wounds to manage.   2. You have an internal ureteral stent OFF strings. We will need to leave this in place until your follow up procedure (2nd look ureteroscopy) in 2-4 weeks.  The stent will likely be removed at that visit, but possible replaced for short period    MEDICATIONS:  - Please use an over the counter antiinflammatory (ie Ibuprofen, naproxen, Ketoralac) and/or Tylenol for pain control as needed.  - You may take 3 days of pyridium as prescribed for numbing the bladder and burning with urination.  - If you have severe pain refractory to Ibuprofen you may use Oxycodone for pain control as well as prescribed. If taking a narcotic, please use a stool softener like miralax or colace to prevent constipation.  - Please complete a course of Keflex (An antibiotic) as prescribed to treat or prevent infection.    FOLLOW-UP:  We will call you to schedule your follow up \"2nd look ureteroscopy\" to finish treating the rest of stone burden in 2-4 weeks with Dr. Mariano. If you have not heard from us in 3-4 business days, please call [phone] to schedule your follow-up appointment. You may also contact this number if you have questions or concerns that can be answered by Dr. Mariano's staff.      WARNING SIGNS:  Fever greater than 101 degrees Fahrenheit, chills, nausea or vomiting, Large amount of clots in urine that make it difficult to urinate or for urine to drain from holder, " increasing pain, or abdominal swelling. If you are experiencing these symptoms, call the Urology Clinic or go to your local PCP or emergency room.    It is normal to see blood in your urine for up to 2 weeks even from surgery. The urine may clear up entirely, and then turn bloody again a few days later depending on your activity level; do not be alarmed. However, if you experience severe pain or tenderness, have a lot of increased bleeding, or find that you are unable to urinate because of large clots, please notify your doctor immediately           Jim Mariano M.D.   [address] [place] [address]   [phone]       HOME CARE INSTRUCTIONS    ACTIVITY: Rest and take it easy for the first 24 hours.  A responsible adult is recommended to remain with you during that time.  It is normal to feel sleepy.  We encourage you to not do anything that requires balance, judgment or coordination.    FOR 24 HOURS DO NOT:  Drive, operate machinery or run household appliances.  Drink beer or alcoholic beverages.  Make important decisions or sign legal documents.    SPECIAL INSTRUCTIONS: See above.    DIET: To avoid nausea, slowly advance diet as tolerated, avoiding spicy or greasy foods for the first day.  Add more substantial food to your diet according to your physician's instructions.  Babies can be fed formula or breast milk as soon as they are hungry.  INCREASE FLUIDS AND FIBER TO AVOID CONSTIPATION.    SURGICAL DRESSING/BATHING: See above.    MEDICATIONS: Resume taking daily medication.  Take prescribed pain medication with food.  If no medication is prescribed, you may take non-aspirin pain medication if needed.  PAIN MEDICATION CAN BE VERY CONSTIPATING.  Take a stool softener or laxative such as senokot, pericolace, or milk of magnesia if needed.    No new prescriptions. No pain medication given.    A follow-up appointment should be arranged with your doctor in 2-4 weeks; call to schedule.    You should CALL YOUR  PHYSICIAN if you develop:  Fever greater than 101 degrees F.  Pain not relieved by medication, or persistent nausea or vomiting.  Excessive bleeding (blood soaking through dressing) or unexpected drainage from the wound.  Extreme redness or swelling around the incision site, drainage of pus or foul smelling drainage.  Inability to urinate or empty your bladder within 8 hours.  Problems with breathing or chest pain.    You should call 911 if you develop problems with breathing or chest pain.  If you are unable to contact your doctor or surgical center, you should go to the nearest emergency room or urgent care center.  Physician's telephone #: Dr. Mariano [phone]    MILD FLU-LIKE SYMPTOMS ARE NORMAL.  YOU MAY EXPERIENCE GENERALIZED MUSCLE ACHES, THROAT IRRITATION, HEADACHE AND/OR SOME NAUSEA.    If any questions arise, call your doctor.  If your doctor is not available, please feel free to call the Surgical Center at [phone].  The Center is open Monday through Friday from 7AM to 7PM.      A registered nurse may call you a few days after your surgery to see how you are doing after your procedure.    You may also receive a survey in the mail within the next two weeks and we ask that you take a few moments to complete the survey and return it to us.  Our goal is to provide you with very good care and we value your comments.     Depression / Suicide Risk    As you are [facility], it is important to learn how to keep safe from harming yourself.    Recognize the warning signs:  Abrupt changes in personality, positive or negative- including increase in energy   Giving away possessions  Change in eating patterns- significant weight changes-  positive or negative  Change in sleeping patterns- unable to sleep or sleeping all the time   Unwillingness or inability to communicate  Depression  Unusual sadness, discouragement and loneliness  Talk of wanting to die  Neglect of personal  appearance   Rebelliousness- reckless behavior  Withdrawal from people/activities they love  Confusion- inability to concentrate     If you or a loved one observes any of these behaviors or has concerns about self-harm, here's what you can do:  Talk about it- your feelings and reasons for harming yourself  Remove any means that you might use to hurt yourself (examples: pills, rope, extension cords, firearm)  Get professional help from the community (Mental Health, Substance Abuse, psychological counseling)  Do not be alone:Call your Safe Contact- someone whom you trust who will be there for you.  Call your local CRISIS HOTLINE 953-7451 or 879-415-6784  Call your local Children's Mobile Crisis Response Team [place] (911) 295-5352 or www.ViRTUAL INTERACTiVE  Call the toll free National Suicide Prevention Hotlines   National Suicide Prevention Lifeline 473-960-MTMZ (6076)  National Hope Line Network 800-SUICIDE (196-3788)    I acknowledge receipt and understanding of these Home Care instructions.

## 2023-01-13 NOTE — ANESTHESIA TIME REPORT
Anesthesia Start and Stop Event Times     Date Time Event    1/13/2023 0858 Ready for Procedure     0927 Anesthesia Start     1029 Anesthesia Stop        Responsible Staff  01/13/23    Name Role Begin End    Amadou Steward M.D. Anesth 0927 1029        Overtime Reason:  no overtime (within assigned shift)    Comments:

## 2023-01-13 NOTE — OR NURSING
Pt's VSS; denies N/V; states pain is at tolerable level. D/c orders received. IV dc'd. Pt changed into clothing with assistance. Pt up and ambulated to BR, steady gait, voided adequately. Discharge instructions given; pt verbalized understanding and questions answered. Patient states ready to d/c home. No prescriptions given, send electronically to pt pharmacy. Pt dc'd in w/c with RN in stable condition.

## 2023-01-13 NOTE — ANESTHESIA POSTPROCEDURE EVALUATION
Patient: Rachael Stone    Procedure Summary     Date: 01/13/23 Room / Location: [place] [address] / SURGERY [facility]    Anesthesia Start: 0927 Anesthesia Stop: 1029    Procedures:       CYSTOSCOPY, WITH LEFT URETEROSCOPY, WITH LITHOTRIPSY, WITH INSERTION OF LEFT URETERAL STENT (Left: Ureter)      URETEROSCOPY (Left: Ureter)      LITHOTRIPSY, USING LASER (Left: Ureter) Diagnosis: (KIDNEY STONE)    Surgeons: Jim Mariano M.D. Responsible Provider: Amadou Steward M.D.    Anesthesia Type: general ASA Status: 2          Final Anesthesia Type: general  Last vitals  BP   Blood Pressure : (!) 146/74    Temp   36.3 °C (97.4 °F)    Pulse   99   Resp   18    SpO2   94 %      Anesthesia Post Evaluation    Patient location during evaluation: PACU  Patient participation: complete - patient participated  Level of consciousness: awake and alert  Pain score: 2    Airway patency: patent  Anesthetic complications: no  Cardiovascular status: hemodynamically stable  Respiratory status: acceptable  Hydration status: euvolemic    PONV: none          There were no known notable events for this encounter.     Nurse Pain Score: 2 (NPRS)

## 2023-01-13 NOTE — H&P
[facility] Consult/H&P Note    Primary Service: Urology  Attending: Jim Mariano M.D.  Patient's Name/MRN: Rachael Stone, [id number]    Admit Date:1/13/2023  Today's Date: 1/13/2023   Length of stay:  LOS: 0 days   Room #: TPREPOOL/NONE      Reason for consult/chief complaint: presents for surgery: left CULTS  ID/HPI: Rachael Stone is a 77 y.o. female patient who presents today for above case for left renal stone.  They have had no changes in their health since last visit with us.       Past Medical History:   Past Medical History:   Diagnosis Date    Acid reflux 11/22/2022    Anesthesia     delayed emergence after cholecystectomy in the 1970s    Arthritis 11/22/2022    on the left side    Bowel habit changes 12/29/2022    constipation    Breath shortness 12/29/2022    upon exertion    Cataract     bilateral IOL    Dental disorder 12/29/2022    upper and lower dentures    Heart burn     Heart murmur 12/29/2022    childhood    Hypertension 11/22/2022    well-controlled on medication    Indigestion     Pain 12/29/2022    left flank pain rates pain 3/10    Renal disorder 11/22/2022    kidney stones    Urinary frequency 11/22/2022    Urinary incontinence         Past Surgical History:   Past Surgical History:   Procedure Laterality Date    IN CYSTOSCOPY,INSERT URETERAL STENT Left 12/8/2022    Procedure: CYSTOSCOPY, WITH LEFT URETEROSCOPY, WITH LITHOTRIPSY, WITH INSERTION OF LEFT URETERAL STENT;  Surgeon: Jim Mariano M.D.;  Location: SURGERY [facility];  Service: Urology    IN CYSTO/URETERO/PYELOSCOPY, DX Left 12/8/2022    Procedure: URETEROSCOPY;  Surgeon: Jim Mariano M.D.;  Location: SURGERY [facility];  Service: Urology    LASERTRIPSY Left 12/8/2022    Procedure: LITHOTRIPSY, USING LASER;  Surgeon: Jim Mariano M.D.;  Location: SURGERY [facility];  Service: Urology    OTHER ABDOMINAL SURGERY  2012    gastric bypass    OTHER      tonsillectomy as a child    OTHER Bilateral      cataract extraction with IOL    OTHER ABDOMINAL SURGERY      cholecystectomy    OTHER ORTHOPEDIC SURGERY Right     total knee replacement    OTHER ORTHOPEDIC SURGERY Left     knee arthroscopy        Family History:   History reviewed. No pertinent family history.      Social History:   Social History     Tobacco Use    Smoking status: Former     Packs/day: 1.00     Years: 25.00     Pack years: 25.00     Types: Cigarettes     Quit date: 1998     Years since quittin.2    Smokeless tobacco: Never   Vaping Use    Vaping Use: Never used   Substance Use Topics    Alcohol use: Yes     Comment: rarely    Drug use: Not Currently      Social History     Social History Narrative    Not on file        Allergies: she Penicillins and Tape    Medications:   Medications Prior to Admission   Medication Sig Dispense Refill Last Dose    oxybutynin SR (DITROPAN-XL) 10 MG CR tablet Take 10 mg by mouth every morning.   2023 at am    benazepril (LOTENSIN) 10 MG Tab Take 10 mg by mouth every morning.   2023 at am    omeprazole (PRILOSEC) 20 MG delayed-release capsule Take 20 mg by mouth every day.   2023 at am    vitamin D (VITAMIND D3) 1000 UNIT Tab Take 1,000 Units by mouth every day.   2d ago    Multiple Vitamin (MULTIVITAMIN PO) Take 2 Tablets by mouth every day. 2 gummies   2d ago    CALCIUM/MAGNESIUM/ZINC FORMULA PO Take 1 Tablet by mouth every day.   2d ago    Potassium 99 MG Tab Take 99 mg by mouth every day.   2d ago    diphenhydrAMINE (BENADRYL) 25 MG capsule Take 25 mg by mouth every 6 hours as needed.   2023 at hs    acetaminophen (TYLENOL) 500 MG Tab Take 500-1,000 mg by mouth every 6 hours as needed.       docusate sodium (COLACE) 100 MG Cap Take 1 Capsule by mouth 2 times a day as needed.   2d ago    ferrous sulfate 325 (65 Fe) MG tablet Take 325 mg by mouth every Monday, Wednesday, and Friday.   2d ago    Cyanocobalamin (VITAMIN B-12 PO) Take 1 Tablet by mouth every day.   2d ago         Review  "of Systems  As per HPI     Physical Exam  VITAL SIGNS: BP (!) 146/74   Pulse 99   Temp 36.3 °C (97.4 °F) (Temporal)   Resp 18   Ht 1.549 m (5' 1\")   Wt 93.7 kg (206 lb 9.1 oz)   LMP  (LMP Unknown)   SpO2 94%   BMI 39.03 kg/m²   Gen: no acute distress  Card: regular rate  Pulm: Breathing comfortably     Labs:              Reviewed          Imaging:  Any pertinent CT imaging reviewed.     Assessment/Recommendation   77 y.o. female here today for left CULTS.  After a full discussion of the alternatives risks and benefits of the procedure, the patient consented to proceeding with the planned procedure.     Consent for planned procedure obtained.  Plan for home post op       Jim Mariano M.D.   [address] [place] [address]   [phone]           "

## 2023-02-23 ENCOUNTER — HOSPITAL ENCOUNTER (OUTPATIENT)
Facility: MEDICAL CENTER | Age: 78
End: 2023-02-23
Attending: STUDENT IN AN ORGANIZED HEALTH CARE EDUCATION/TRAINING PROGRAM | Admitting: STUDENT IN AN ORGANIZED HEALTH CARE EDUCATION/TRAINING PROGRAM
Payer: MEDICARE

## 2023-11-01 ENCOUNTER — HOSPITAL ENCOUNTER (OUTPATIENT)
Dept: RADIOLOGY | Facility: MEDICAL CENTER | Age: 78
End: 2023-11-01
Attending: STUDENT IN AN ORGANIZED HEALTH CARE EDUCATION/TRAINING PROGRAM
Payer: MEDICARE

## 2023-11-01 DIAGNOSIS — N20.0 CALCULUS OF KIDNEY: ICD-10-CM

## 2023-11-01 PROCEDURE — 74176 CT ABD & PELVIS W/O CONTRAST: CPT

## 2024-05-22 ENCOUNTER — HOSPITAL ENCOUNTER (OUTPATIENT)
Dept: RADIOLOGY | Facility: MEDICAL CENTER | Age: 79
End: 2024-05-22
Payer: MEDICARE

## 2024-06-13 ENCOUNTER — HOSPITAL ENCOUNTER (OUTPATIENT)
Dept: RADIOLOGY | Facility: MEDICAL CENTER | Age: 79
End: 2024-06-13
Attending: FAMILY MEDICINE
Payer: MEDICARE

## 2024-06-13 DIAGNOSIS — Z12.31 ENCOUNTER FOR MAMMOGRAM TO ESTABLISH BASELINE MAMMOGRAM: ICD-10-CM

## 2024-06-13 PROCEDURE — 77063 BREAST TOMOSYNTHESIS BI: CPT

## 2024-06-21 ENCOUNTER — HOSPITAL ENCOUNTER (OUTPATIENT)
Dept: RADIOLOGY | Facility: MEDICAL CENTER | Age: 79
End: 2024-06-21
Attending: FAMILY MEDICINE
Payer: MEDICARE

## 2024-06-21 DIAGNOSIS — R92.8 ABNORMAL MAMMOGRAM: ICD-10-CM

## 2024-06-21 PROCEDURE — G0279 TOMOSYNTHESIS, MAMMO: HCPCS

## 2024-07-03 ENCOUNTER — HOSPITAL ENCOUNTER (OUTPATIENT)
Dept: RADIOLOGY | Facility: MEDICAL CENTER | Age: 79
End: 2024-07-03
Attending: FAMILY MEDICINE
Payer: MEDICARE

## 2024-07-03 DIAGNOSIS — R92.8 ABNORMAL FINDINGS ON DIAGNOSTIC IMAGING OF BREAST: ICD-10-CM

## 2024-07-03 LAB — PATHOLOGY CONSULT NOTE: NORMAL

## 2024-07-03 PROCEDURE — A4648 IMPLANTABLE TISSUE MARKER: HCPCS

## 2024-07-03 PROCEDURE — 88305 TISSUE EXAM BY PATHOLOGIST: CPT | Mod: 59

## 2024-09-27 NOTE — H&P
[facility] [place] Consult/H&P Note    Primary Service: Urology  Attending: Jim Mariano M.D.  Patient's Name/MRN: Rachael Stone, [id number]    Admit Date:2022  Today's Date: 2022   Length of stay:  LOS: 0 days   Room #: TPREPOOL/NONE      Reason for consult/chief complaint: presents for surgery: left CULTS  ID/HPI: Rachael Stone is a 77 y.o. female patient who presents today for above case for nephrolithiasis. Prior PCNL w/ incomplete clearance.  They have had no changes in their health since last visit with us.       Past Medical History:   Past Medical History:   Diagnosis Date    Acid reflux 2022    Anesthesia     delayed emergence after cholecystectomy in the 1970s    Arthritis 2022    on the left side    Cataract     bilateral IOL    Hypertension 2022    well-controlled on medication    Renal disorder 2022    kidney stones    Urinary frequency 2022        Past Surgical History:   Past Surgical History:   Procedure Laterality Date    OTHER ABDOMINAL SURGERY      gastric bypass    OTHER      tonsillectomy as a child    OTHER Bilateral     cataract extraction with IOL    OTHER ABDOMINAL SURGERY      cholecystectomy    OTHER ORTHOPEDIC SURGERY Right     total knee replacement    OTHER ORTHOPEDIC SURGERY Left     knee arthroscopy        Family History:   History reviewed. No pertinent family history.      Social History:   Social History     Tobacco Use    Smoking status: Former     Packs/day: 1.00     Years: 25.00     Pack years: 25.00     Types: Cigarettes     Quit date: 1998     Years since quittin.1    Smokeless tobacco: Never   Vaping Use    Vaping Use: Never used   Substance Use Topics    Alcohol use: Yes     Comment: rarely    Drug use: Not Currently      Social History     Social History Narrative    Not on file        Allergies: she Penicillins and Tape    Medications:   Medications Prior to Admission   Medication Sig Dispense Refill Last Dose     "oxybutynin SR (DITROPAN-XL) 10 MG CR tablet Take 10 mg by mouth every morning.   1 wk ago at [facility]    benazepril (LOTENSIN) 10 MG Tab Take 10 mg by mouth every morning.   1 wk ago at [facility]    omeprazole (PRILOSEC) 20 MG delayed-release capsule Take 20 mg by mouth every day.   1 wk ago at [facility]    vitamin D (VITAMIND D3) 1000 UNIT Tab Take 1,000 Units by mouth every day.   1 wk ago at [facility]    Multiple Vitamin (MULTIVITAMIN PO) Take 1 Tablet by mouth every day.   1 wk ago at [facility]    CALCIUM/MAGNESIUM/ZINC FORMULA PO Take 1 Tablet by mouth every day.   1 wk ago at [facility]    Potassium 99 MG Tab Take 99 mg by mouth every day.   1 wk ago at [facility]    diphenhydrAMINE (BENADRYL) 25 MG capsule Take 25 mg by mouth every 6 hours as needed.   1 wk ago at [facility]    acetaminophen (TYLENOL) 500 MG Tab Take 500-1,000 mg by mouth every 6 hours as needed.   1 wk ago at [facility]    docusate sodium (COLACE) 100 MG Cap Take 1 Capsule by mouth 2 times a day as needed.   unk at [facility]    ferrous sulfate 325 (65 Fe) MG tablet Take 325 mg by mouth every Monday, Wednesday, and Friday.   1 wk ago at [facility]    Cyanocobalamin (VITAMIN B-12 PO) Take 1 Tablet by mouth every day.   1 wk ago at [facility]         Review of Systems  As per HPI     Physical Exam  VITAL SIGNS: BP (!) 152/70   Pulse 95   Temp 36.4 °C (97.5 °F) (Temporal)   Resp 18   Ht 1.549 m (5' 1\")   Wt 93.8 kg (206 lb 12.7 oz)   SpO2 98%   BMI 39.07 kg/m²   Gen: No acute distress  Card: Regular rate  Pulm: Breathing comfortably     Labs:              Reviewed          Imaging:  Any pertinent CT imaging reviewed  Reviewed     Assessment/Recommendation   77 y.o. female here today for left CULTS.  After a full discussion of the alternatives risks and benefits of the procedure, the patient consented to proceeding with the planned procedure.     Consent for planned procedure obtained.  Plan for home post op       Jim Mariano M.D.   [address] [place] [address]   [phone]           " yes

## 2025-02-03 ENCOUNTER — HOSPITAL ENCOUNTER (OUTPATIENT)
Dept: RADIOLOGY | Facility: MEDICAL CENTER | Age: 80
End: 2025-02-03
Attending: PHYSICAL MEDICINE & REHABILITATION
Payer: MEDICARE

## 2025-02-03 DIAGNOSIS — M25.551 RIGHT HIP PAIN: ICD-10-CM

## 2025-02-03 DIAGNOSIS — R31.9 LOIN PAIN-HEMATURIA SYNDROME: ICD-10-CM

## 2025-02-03 DIAGNOSIS — M54.50 LOIN PAIN-HEMATURIA SYNDROME: ICD-10-CM

## 2025-02-03 PROCEDURE — 73502 X-RAY EXAM HIP UNI 2-3 VIEWS: CPT | Mod: RT

## 2025-02-03 PROCEDURE — 72100 X-RAY EXAM L-S SPINE 2/3 VWS: CPT

## (undated) DEVICE — SPONGE GAUZESTER 4 X 4 4PLY - (128PK/CA)

## (undated) DEVICE — WIRE GUIDE SENSOR DUAL FLEX - 5/BX

## (undated) DEVICE — SUCTION INSTRUMENT YANKAUER BULBOUS TIP W/O VENT (50EA/CA)

## (undated) DEVICE — ADAPTOR UROLOK - 10/BX

## (undated) DEVICE — COVER FOOT UNIVERSAL DISP. - (25EA/CA)

## (undated) DEVICE — PACK CYSTO III (2EA/CA)

## (undated) DEVICE — FIBER LASER MOSES 200 UM (1/EA)

## (undated) DEVICE — BAG URODRAIN WITH TUBING - (20/CA)

## (undated) DEVICE — SET EXTENSION WITH 2 PORTS (48EA/CA) ***PART #2C8610 IS A SUBSTITUTE*****

## (undated) DEVICE — TUBING CLEARLINK DUO-VENT - C-FLO (48EA/CA)

## (undated) DEVICE — SLEEVE VASO CALF MED - (10PR/CA)

## (undated) DEVICE — CANISTER SUCTION 3000ML MECHANICAL FILTER AUTO SHUTOFF MEDI-VAC NONSTERILE LF DISP  (40EA/CA)

## (undated) DEVICE — SODIUM CHL. IRRIGATION 0.9% 3000ML (4EA/CA 65CA/PF)

## (undated) DEVICE — WATER IRRIGATION STERILE 1000ML (12EA/CA)

## (undated) DEVICE — PACK CYSTOSCOPY III - (8/CA)

## (undated) DEVICE — GLOVE SZ 7.5 BIOGEL PI MICRO - PF LF (50PR/BX)

## (undated) DEVICE — GOWN SURGEONS X-LARGE - DISP. (30/CA)

## (undated) DEVICE — SCOPE DIGITAL URETEROSCOPE APTRA SINGLE USE STANDARD DEFLECTION (1EA)

## (undated) DEVICE — TOWEL STOP TIMEOUT SAFETY FLAG (40EA/CA)

## (undated) DEVICE — SENSOR OXIMETER ADULT SPO2 RD SET (20EA/BX)

## (undated) DEVICE — GOWN WARMING STANDARD FLEX - (30/CA)

## (undated) DEVICE — SET LEADWIRE 5 LEAD BEDSIDE DISPOSABLE ECG (1SET OF 5/EA)

## (undated) DEVICE — WATER IRRIG. STER 3000 ML - (4/CA)

## (undated) DEVICE — GOWN SURGICAL X-LARGE ULTRA - FILM-REINFORCED (20/CA)

## (undated) DEVICE — CONNECTOR HOSE NEPTUNE FOR CYSTO ROOM

## (undated) DEVICE — SCOPE DIGITAL URETEROSCOPE DISPOSABLE (10EA/PK)

## (undated) DEVICE — Device

## (undated) DEVICE — LACTATED RINGERS INJ 1000 ML - (14EA/CA 60CA/PF)

## (undated) DEVICE — COVER LIGHT HANDLE ALC PLUS DISP (18EA/BX)